# Patient Record
Sex: MALE | Race: WHITE | Employment: OTHER | URBAN - METROPOLITAN AREA
[De-identification: names, ages, dates, MRNs, and addresses within clinical notes are randomized per-mention and may not be internally consistent; named-entity substitution may affect disease eponyms.]

---

## 2019-05-22 ENCOUNTER — APPOINTMENT (OUTPATIENT)
Dept: GENERAL RADIOLOGY | Age: 59
End: 2019-05-22
Payer: COMMERCIAL

## 2019-05-22 ENCOUNTER — HOSPITAL ENCOUNTER (EMERGENCY)
Age: 59
Discharge: HOME | End: 2019-05-22
Attending: EMERGENCY MEDICINE
Payer: COMMERCIAL

## 2019-05-22 VITALS
DIASTOLIC BLOOD PRESSURE: 88 MMHG | HEIGHT: 71 IN | WEIGHT: 190 LBS | HEART RATE: 60 BPM | RESPIRATION RATE: 20 BRPM | BODY MASS INDEX: 26.6 KG/M2 | OXYGEN SATURATION: 99 % | SYSTOLIC BLOOD PRESSURE: 138 MMHG

## 2019-05-22 DIAGNOSIS — R06.02 SOB (SHORTNESS OF BREATH): Primary | ICD-10-CM

## 2019-05-22 DIAGNOSIS — K43.9 VENTRAL HERNIA WITHOUT OBSTRUCTION OR GANGRENE: ICD-10-CM

## 2019-05-22 LAB
A/G RATIO: 1.4 (ref 1.1–2.2)
ALBUMIN SERPL-MCNC: 4.6 G/DL (ref 3.4–5)
ALP BLD-CCNC: 104 U/L (ref 40–129)
ALT SERPL-CCNC: 25 U/L (ref 10–40)
ANION GAP SERPL CALCULATED.3IONS-SCNC: 12 MMOL/L (ref 3–16)
APTT: 30.6 SEC (ref 26–36)
AST SERPL-CCNC: 20 U/L (ref 15–37)
BASOPHILS ABSOLUTE: 0.1 K/UL (ref 0–0.2)
BASOPHILS RELATIVE PERCENT: 0.9 %
BILIRUB SERPL-MCNC: 0.9 MG/DL (ref 0–1)
BUN BLDV-MCNC: 18 MG/DL (ref 7–20)
CALCIUM SERPL-MCNC: 10.1 MG/DL (ref 8.3–10.6)
CHLORIDE BLD-SCNC: 110 MMOL/L (ref 99–110)
CO2: 21 MMOL/L (ref 21–32)
CREAT SERPL-MCNC: 1.1 MG/DL (ref 0.9–1.3)
D DIMER: <200 NG/ML DDU (ref 0–229)
EOSINOPHILS ABSOLUTE: 0.1 K/UL (ref 0–0.6)
EOSINOPHILS RELATIVE PERCENT: 0.7 %
GFR AFRICAN AMERICAN: >60
GFR NON-AFRICAN AMERICAN: >60
GLOBULIN: 3.2 G/DL
GLUCOSE BLD-MCNC: 111 MG/DL (ref 70–99)
HCT VFR BLD CALC: 48.4 % (ref 40.5–52.5)
HEMOGLOBIN: 16.4 G/DL (ref 13.5–17.5)
INR BLD: 1.05 (ref 0.86–1.14)
LYMPHOCYTES ABSOLUTE: 2.7 K/UL (ref 1–5.1)
LYMPHOCYTES RELATIVE PERCENT: 22 %
MCH RBC QN AUTO: 30.6 PG (ref 26–34)
MCHC RBC AUTO-ENTMCNC: 34 G/DL (ref 31–36)
MCV RBC AUTO: 90.1 FL (ref 80–100)
MONOCYTES ABSOLUTE: 1 K/UL (ref 0–1.3)
MONOCYTES RELATIVE PERCENT: 8.3 %
NEUTROPHILS ABSOLUTE: 8.3 K/UL (ref 1.7–7.7)
NEUTROPHILS RELATIVE PERCENT: 68.1 %
PDW BLD-RTO: 13.2 % (ref 12.4–15.4)
PLATELET # BLD: 250 K/UL (ref 135–450)
PMV BLD AUTO: 7.9 FL (ref 5–10.5)
POTASSIUM REFLEX MAGNESIUM: 4.1 MMOL/L (ref 3.5–5.1)
PRO-BNP: 87 PG/ML (ref 0–124)
PROTHROMBIN TIME: 11.9 SEC (ref 9.8–13)
RBC # BLD: 5.37 M/UL (ref 4.2–5.9)
SODIUM BLD-SCNC: 143 MMOL/L (ref 136–145)
TOTAL PROTEIN: 7.8 G/DL (ref 6.4–8.2)
TROPONIN: <0.01 NG/ML
WBC # BLD: 12.2 K/UL (ref 4–11)

## 2019-05-22 PROCEDURE — 83880 ASSAY OF NATRIURETIC PEPTIDE: CPT

## 2019-05-22 PROCEDURE — 85025 COMPLETE CBC W/AUTO DIFF WBC: CPT

## 2019-05-22 PROCEDURE — 93005 ELECTROCARDIOGRAM TRACING: CPT | Performed by: EMERGENCY MEDICINE

## 2019-05-22 PROCEDURE — 85379 FIBRIN DEGRADATION QUANT: CPT

## 2019-05-22 PROCEDURE — 84484 ASSAY OF TROPONIN QUANT: CPT

## 2019-05-22 PROCEDURE — 99285 EMERGENCY DEPT VISIT HI MDM: CPT

## 2019-05-22 PROCEDURE — 36415 COLL VENOUS BLD VENIPUNCTURE: CPT

## 2019-05-22 PROCEDURE — 85610 PROTHROMBIN TIME: CPT

## 2019-05-22 PROCEDURE — 71046 X-RAY EXAM CHEST 2 VIEWS: CPT

## 2019-05-22 PROCEDURE — 85730 THROMBOPLASTIN TIME PARTIAL: CPT

## 2019-05-22 PROCEDURE — 80053 COMPREHEN METABOLIC PANEL: CPT

## 2019-05-22 RX ORDER — CALCIUM CARBONATE 200(500)MG
1 TABLET,CHEWABLE ORAL DAILY
COMMUNITY

## 2019-05-22 ASSESSMENT — ENCOUNTER SYMPTOMS
VOMITING: 0
VOICE CHANGE: 0
BACK PAIN: 0
CHEST TIGHTNESS: 1
BLOOD IN STOOL: 0
SPUTUM PRODUCTION: 0
COLOR CHANGE: 0
NAUSEA: 0
STRIDOR: 0
ABDOMINAL PAIN: 0
TROUBLE SWALLOWING: 0
SORE THROAT: 0
PHOTOPHOBIA: 0
WHEEZING: 0
SHORTNESS OF BREATH: 1
FACIAL SWELLING: 0

## 2019-05-22 NOTE — ED NOTES
Pt denies fever, cough, edema or further c/o's.  Resps even and unlab, cardiac monitor applied     Gaudencio Johnston RN  05/22/19 3070

## 2019-05-22 NOTE — ED PROVIDER NOTES
Social History Narrative    None         PHYSICAL EXAM    (up to 7 for level 4, 8 or more for level 5)     Vitals:    05/22/19 1719   BP: 138/88   Pulse: 60   Resp: 20   SpO2: 99%       Physical Exam   Constitutional: He is oriented to person, place, and time. He appears well-developed and well-nourished. No distress. HENT:   Head: Normocephalic and atraumatic. Right Ear: External ear normal.   Left Ear: External ear normal.   Eyes: Conjunctivae and EOM are normal.   Neck: Neck supple. No JVD present. No tracheal deviation present. Cardiovascular: Normal rate and intact distal pulses. Pulmonary/Chest: Effort normal and breath sounds normal. No respiratory distress. He has no wheezes. Abdominal: Soft. He exhibits mass (easily reducible ventral hernia). He exhibits no distension. There is no tenderness. There is no rebound and no guarding. Musculoskeletal: Normal range of motion. He exhibits edema (1+ edema equal bilaterally). He exhibits no tenderness. No lower extremity tenderness. No palpable venous cord. Negative Homans sign   Neurological: He is alert and oriented to person, place, and time. No cranial nerve deficit. 5/5 strength in all 4 extremities. Normal gait. Skin: Skin is warm and dry. Nursing note and vitals reviewed. DIAGNOSTIC RESULTS     EKG:All EKG's are interpreted by the Emergency Department Physician who either signs or Co-signs this chart in the absence of a cardiologist.    The Ekg interpreted by me shows  normal sinus rhythm with a rate of 86  Axis is   Normal  QTc is  normal  Intervals and Durations are unremarkable. ST Segments: normal  No previous EKGs available or comparison      RADIOLOGY:     Interpretation per the Radiologist below, if available at the time of this note:    XR CHEST STANDARD (2 VW)   Final Result   No evidence of acute cardiopulmonary disease.              LABS:  Labs Reviewed   CBC WITH AUTO DIFFERENTIAL - Abnormal; Notable for the following components:       Result Value    WBC 12.2 (*)     Neutrophils # 8.3 (*)     All other components within normal limits    Narrative:     Performed at:  [facility]. [facility]  [address] [place] [address]. [facility] [employer]   Phone [phone]   COMPREHENSIVE METABOLIC PANEL W/ REFLEX TO MG FOR LOW K - Abnormal; Notable for the following components:    Glucose 111 (*)     All other components within normal limits    Narrative:     Performed at:  [facility]. [facility]  [address] [place] [address]. [facility] [address]   Phone [phone]   TROPONIN    Narrative:     Performed at:  [facility]  [address] [place] [address]. [facility]   Phone [phone]   APTT    Narrative:     Performed at:  [facility]. [facility]  [address] [place] [address]. [place] [id number]   Phone [phone]    Narrative:     Performed at:  [facility]. [facility]  [address] [place] [address]. [place] [facility] [employer]   Phone [phone]   D-DIMER, QUANTITATIVE    Narrative:     Performed at:  [facility]. [facility]  [address] [place] [address]. [facility] [address]   Phone [phone]    Narrative:     Performed at:  [facility]. [facility]  [address] [place] [address]. [facility]   Phone [phone]       All otherlabs were within normal range or not returned as of this dictation. EMERGENCY DEPARTMENT COURSE and DIFFERENTIAL DIAGNOSIS/MDM:   Vitals:    Vitals:    05/22/19 1523 05/22/19 1719   BP:  138/88   Pulse:  60   Resp:  20   TempSrc: Oral    SpO2:  99%   Weight: 190 lb (86.2 kg)    Height: 5' 11\" (1.803 m)          MDM  Patient is hemodynamically stable and in no acute distress. Laboratory evaluation is unremarkable.   I do not suspect PE as the patient has a Pre-Services  [phone]            (Please note that portions of this note were completed with a voice recognition program.  Efforts were made to edit the dictations but occasionally words aremis-transcribed. )    Marilin Mack MD (electronically signed)  Attending Emergency Physician           Marilin Mack MD  05/22/19 6074

## 2019-05-22 NOTE — ED NOTES
Pt DC home in good condition. V/u of Dc instructions. Denies questions or concerns. Teaching done re: s/s to report.      Rahul Smith, ZEYAD  05/22/19 6481

## 2019-05-23 LAB
EKG ATRIAL RATE: 86 BPM
EKG DIAGNOSIS: NORMAL
EKG P AXIS: 63 DEGREES
EKG P-R INTERVAL: 154 MS
EKG Q-T INTERVAL: 352 MS
EKG QRS DURATION: 84 MS
EKG QTC CALCULATION (BAZETT): 421 MS
EKG R AXIS: 72 DEGREES
EKG T AXIS: 52 DEGREES
EKG VENTRICULAR RATE: 86 BPM

## 2019-05-23 PROCEDURE — 93010 ELECTROCARDIOGRAM REPORT: CPT | Performed by: INTERNAL MEDICINE
